# Patient Record
Sex: FEMALE | Race: WHITE | HISPANIC OR LATINO | Employment: FULL TIME | URBAN - METROPOLITAN AREA
[De-identification: names, ages, dates, MRNs, and addresses within clinical notes are randomized per-mention and may not be internally consistent; named-entity substitution may affect disease eponyms.]

---

## 2024-08-09 ENCOUNTER — HOSPITAL ENCOUNTER (OUTPATIENT)
Dept: LAB | Facility: MEDICAL CENTER | Age: 34
End: 2024-08-09
Attending: FAMILY MEDICINE
Payer: COMMERCIAL

## 2024-08-09 ENCOUNTER — OFFICE VISIT (OUTPATIENT)
Dept: MEDICAL GROUP | Facility: PHYSICIAN GROUP | Age: 34
End: 2024-08-09
Payer: COMMERCIAL

## 2024-08-09 VITALS
SYSTOLIC BLOOD PRESSURE: 126 MMHG | BODY MASS INDEX: 51.91 KG/M2 | HEIGHT: 63 IN | RESPIRATION RATE: 18 BRPM | HEART RATE: 71 BPM | WEIGHT: 293 LBS | OXYGEN SATURATION: 99 % | DIASTOLIC BLOOD PRESSURE: 76 MMHG | TEMPERATURE: 97.7 F

## 2024-08-09 DIAGNOSIS — R63.5 WEIGHT GAIN: ICD-10-CM

## 2024-08-09 DIAGNOSIS — E66.01 MORBID OBESITY WITH BMI OF 50.0-59.9, ADULT (HCC): ICD-10-CM

## 2024-08-09 DIAGNOSIS — Z00.00 WELLNESS EXAMINATION: ICD-10-CM

## 2024-08-09 DIAGNOSIS — E55.9 VITAMIN D DEFICIENCY: ICD-10-CM

## 2024-08-09 DIAGNOSIS — R73.03 PREDIABETES: ICD-10-CM

## 2024-08-09 DIAGNOSIS — D64.89 ANEMIA DUE TO OTHER CAUSE, NOT CLASSIFIED: ICD-10-CM

## 2024-08-09 DIAGNOSIS — R79.89 ELEVATED LIVER FUNCTION TESTS: ICD-10-CM

## 2024-08-09 LAB
ALBUMIN SERPL BCP-MCNC: 3.8 G/DL (ref 3.2–4.9)
ALBUMIN/GLOB SERPL: 1.1 G/DL
ALP SERPL-CCNC: 70 U/L (ref 30–99)
ALT SERPL-CCNC: 23 U/L (ref 2–50)
ANION GAP SERPL CALC-SCNC: 11 MMOL/L (ref 7–16)
ANISOCYTOSIS BLD QL SMEAR: ABNORMAL
AST SERPL-CCNC: 29 U/L (ref 12–45)
BASOPHILS # BLD AUTO: 0.7 % (ref 0–1.8)
BASOPHILS # BLD: 0.07 K/UL (ref 0–0.12)
BILIRUB SERPL-MCNC: 0.6 MG/DL (ref 0.1–1.5)
BUN SERPL-MCNC: 12 MG/DL (ref 8–22)
CALCIUM ALBUM COR SERPL-MCNC: 9.3 MG/DL (ref 8.5–10.5)
CALCIUM SERPL-MCNC: 9.1 MG/DL (ref 8.5–10.5)
CHLORIDE SERPL-SCNC: 104 MMOL/L (ref 96–112)
CHOLEST SERPL-MCNC: 199 MG/DL (ref 100–199)
CO2 SERPL-SCNC: 24 MMOL/L (ref 20–33)
COMMENT 1642: NORMAL
CREAT SERPL-MCNC: 0.67 MG/DL (ref 0.5–1.4)
EOSINOPHIL # BLD AUTO: 0.17 K/UL (ref 0–0.51)
EOSINOPHIL NFR BLD: 1.8 % (ref 0–6.9)
ERYTHROCYTE [DISTWIDTH] IN BLOOD BY AUTOMATED COUNT: 46.6 FL (ref 35.9–50)
EST. AVERAGE GLUCOSE BLD GHB EST-MCNC: 105 MG/DL
FASTING STATUS PATIENT QL REPORTED: NORMAL
GFR SERPLBLD CREATININE-BSD FMLA CKD-EPI: 118 ML/MIN/1.73 M 2
GLOBULIN SER CALC-MCNC: 3.5 G/DL (ref 1.9–3.5)
GLUCOSE SERPL-MCNC: 101 MG/DL (ref 65–99)
HBA1C MFR BLD: 5.3 % (ref 4–5.6)
HCT VFR BLD AUTO: 40.9 % (ref 37–47)
HDLC SERPL-MCNC: 57 MG/DL
HGB BLD-MCNC: 12.2 G/DL (ref 12–16)
IMM GRANULOCYTES # BLD AUTO: 0.02 K/UL (ref 0–0.11)
IMM GRANULOCYTES NFR BLD AUTO: 0.2 % (ref 0–0.9)
IRON SATN MFR SERPL: 15 % (ref 15–55)
IRON SERPL-MCNC: 60 UG/DL (ref 40–170)
LDLC SERPL CALC-MCNC: 119 MG/DL
LYMPHOCYTES # BLD AUTO: 3.31 K/UL (ref 1–4.8)
LYMPHOCYTES NFR BLD: 35.1 % (ref 22–41)
MCH RBC QN AUTO: 24.4 PG (ref 27–33)
MCHC RBC AUTO-ENTMCNC: 29.8 G/DL (ref 32.2–35.5)
MCV RBC AUTO: 81.6 FL (ref 81.4–97.8)
MICROCYTES BLD QL SMEAR: ABNORMAL
MONOCYTES # BLD AUTO: 0.71 K/UL (ref 0–0.85)
MONOCYTES NFR BLD AUTO: 7.5 % (ref 0–13.4)
MORPHOLOGY BLD-IMP: NORMAL
NEUTROPHILS # BLD AUTO: 5.15 K/UL (ref 1.82–7.42)
NEUTROPHILS NFR BLD: 54.7 % (ref 44–72)
NRBC # BLD AUTO: 0 K/UL
NRBC BLD-RTO: 0 /100 WBC (ref 0–0.2)
PLATELET # BLD AUTO: 309 K/UL (ref 164–446)
PLATELET BLD QL SMEAR: NORMAL
PMV BLD AUTO: 10 FL (ref 9–12.9)
POTASSIUM SERPL-SCNC: 4.5 MMOL/L (ref 3.6–5.5)
PROT SERPL-MCNC: 7.3 G/DL (ref 6–8.2)
RBC # BLD AUTO: 5.01 M/UL (ref 4.2–5.4)
RBC BLD AUTO: PRESENT
SODIUM SERPL-SCNC: 139 MMOL/L (ref 135–145)
TIBC SERPL-MCNC: 411 UG/DL (ref 250–450)
TRIGL SERPL-MCNC: 117 MG/DL (ref 0–149)
UIBC SERPL-MCNC: 351 UG/DL (ref 110–370)
WBC # BLD AUTO: 9.4 K/UL (ref 4.8–10.8)

## 2024-08-09 PROCEDURE — 85025 COMPLETE CBC W/AUTO DIFF WBC: CPT

## 2024-08-09 PROCEDURE — 83550 IRON BINDING TEST: CPT

## 2024-08-09 PROCEDURE — 83036 HEMOGLOBIN GLYCOSYLATED A1C: CPT

## 2024-08-09 PROCEDURE — 3074F SYST BP LT 130 MM HG: CPT | Performed by: FAMILY MEDICINE

## 2024-08-09 PROCEDURE — 80053 COMPREHEN METABOLIC PANEL: CPT

## 2024-08-09 PROCEDURE — 99214 OFFICE O/P EST MOD 30 MIN: CPT | Performed by: FAMILY MEDICINE

## 2024-08-09 PROCEDURE — 84443 ASSAY THYROID STIM HORMONE: CPT

## 2024-08-09 PROCEDURE — 3078F DIAST BP <80 MM HG: CPT | Performed by: FAMILY MEDICINE

## 2024-08-09 PROCEDURE — 83540 ASSAY OF IRON: CPT

## 2024-08-09 PROCEDURE — 36415 COLL VENOUS BLD VENIPUNCTURE: CPT

## 2024-08-09 PROCEDURE — 80061 LIPID PANEL: CPT

## 2024-08-09 PROCEDURE — 82306 VITAMIN D 25 HYDROXY: CPT

## 2024-08-09 PROCEDURE — 82728 ASSAY OF FERRITIN: CPT

## 2024-08-09 ASSESSMENT — PATIENT HEALTH QUESTIONNAIRE - PHQ9: CLINICAL INTERPRETATION OF PHQ2 SCORE: 0

## 2024-08-09 NOTE — PROGRESS NOTES
"Subjective:     CC:   Chief Complaint   Patient presents with    Follow-Up       HPI:   Radha presents today to discuss trying to lose weight.  Last time I did see patient was in August 2022.  I did inform patient I will need to see her back as we need to catch up and do blood work patient expressed understanding of this.  When I last seen her patient's ultrasound showed a fatty liver and some slight enlargement to her spleen.  Patient also showed some anemia patient states her menstrual cycles are not monthly occasionally she still bleeds heavily.    No past medical history on file.    Social History     Tobacco Use    Smoking status: Never    Smokeless tobacco: Never   Vaping Use    Vaping status: Never Used   Substance Use Topics    Alcohol use: Yes     Comment: Occ    Drug use: Never       Current Outpatient Medications Ordered in Epic   Medication Sig Dispense Refill    ferrous sulfate 325 (65 Fe) MG tablet Take 1 Tablet by mouth 2 times a day. 60 Tablet 1     No current Epic-ordered facility-administered medications on file.       Allergies:  Mushroom extract complex and Shellfish-derived products    Health Maintenance: Completed    ROS:  Gen: no fevers/chills, patient has lost 30 pounds since have last seen her 2 years ago  Eyes: no changes in vision  ENT: no sore throat, no hearing loss, no bloody nose  Pulm: no sob, no cough  CV: no chest pain, no palpitations  GI: no nausea/vomiting, no diarrhea  : no dysuria  MSk: no myalgias  Skin: no rash  Neuro: no headaches, no numbness/tingling  Heme/Lymph: no easy bruising    Objective:     Exam:  /76 (BP Location: Right arm, Patient Position: Sitting, BP Cuff Size: Large adult)   Pulse 71   Temp 36.5 °C (97.7 °F) (Temporal)   Resp 18   Ht 1.6 m (5' 3\")   Wt (!) 135 kg (298 lb 3.2 oz)   LMP 07/19/2024 (Approximate)   SpO2 99%   BMI 52.82 kg/m²  Body mass index is 52.82 kg/m².    Gen: Alert and oriented, No apparent distress.  Skin: Warm and dry.  No " obvious lesions.  Eyes: Sclera wnl Pupils normal in size  Lungs: Normal effort, CTA bilaterally, no wheezes, rhonchi, or rales  CV: Regular rate and rhythm. No murmurs, rubs, or gallops.  Musculoskeletal: Normal gait. No extremity cyanosis, clubbing, or edema.  Neuro: Oriented to person, place and time  Psych: Mood is wnl     Labs: Patient given a listing of all the [facility] labs inform patient she should get her lab work done fasting    Assessment & Plan:     34 y.o. female with the following -     1. Anemia due to other cause, not classified  Recommend checking her CBC and iron due to fact it was abnormal 2 years ago  - CBC WITH DIFFERENTIAL; Future  - FERRITIN; Future  - IRON/TOTAL IRON BIND; Future    2. Prediabetes  Recommend rechecking this again  - Comp Metabolic Panel; Future  - HEMOGLOBIN A1C; Future    3. Elevated liver function tests  Recommend checking her liver test  - Comp Metabolic Panel; Future    4. Weight gain  Patient is agreeable to see nutritionist also will check her thyroid  - TSH; Future  - Referral to Nutrition Services    5. Vitamin D deficiency  Commend checking her vitamin D  - VITAMIN D,25 HYDROXY (DEFICIENCY); Future    6. Wellness examination  - Lipid Profile; Future    7. Morbid obesity with BMI of 50.0-59.9, adult (HCC)  Patient is uncertain on what she wants to do to lose weight her goal is to go down below 200.  I did inform patient this will take time she may want to check with her insurance to see if she qualifies for gastric bypass or lap band I will see her back in a couple weeks.  - Referral to Nutrition Services       Return in about 3 weeks (around 8/30/2024), or if symptoms worsen or fail to improve.    Please note that this dictation was created using voice recognition software. I have made every reasonable attempt to correct obvious errors, but I expect that there are errors of grammar and possibly content that I did not discover before finalizing the note.

## 2024-08-10 LAB
25(OH)D3 SERPL-MCNC: 14 NG/ML (ref 30–100)
FERRITIN SERPL-MCNC: 9.4 NG/ML (ref 10–291)
TSH SERPL-ACNC: 2.27 UIU/ML (ref 0.35–5.5)

## 2024-08-20 ENCOUNTER — OFFICE VISIT (OUTPATIENT)
Dept: MEDICAL GROUP | Facility: PHYSICIAN GROUP | Age: 34
End: 2024-08-20
Payer: COMMERCIAL

## 2024-08-20 VITALS
OXYGEN SATURATION: 97 % | HEIGHT: 63 IN | HEART RATE: 75 BPM | BODY MASS INDEX: 51.91 KG/M2 | DIASTOLIC BLOOD PRESSURE: 70 MMHG | TEMPERATURE: 97.5 F | RESPIRATION RATE: 18 BRPM | WEIGHT: 293 LBS | SYSTOLIC BLOOD PRESSURE: 110 MMHG

## 2024-08-20 DIAGNOSIS — E66.01 MORBID OBESITY WITH BMI OF 50.0-59.9, ADULT (HCC): ICD-10-CM

## 2024-08-20 DIAGNOSIS — R73.03 PREDIABETES: ICD-10-CM

## 2024-08-20 DIAGNOSIS — R79.89 ELEVATED LIVER FUNCTION TESTS: ICD-10-CM

## 2024-08-20 DIAGNOSIS — D64.89 ANEMIA DUE TO OTHER CAUSE, NOT CLASSIFIED: ICD-10-CM

## 2024-08-20 PROCEDURE — 99214 OFFICE O/P EST MOD 30 MIN: CPT | Performed by: FAMILY MEDICINE

## 2024-08-20 PROCEDURE — 3074F SYST BP LT 130 MM HG: CPT | Performed by: FAMILY MEDICINE

## 2024-08-20 PROCEDURE — 3078F DIAST BP <80 MM HG: CPT | Performed by: FAMILY MEDICINE

## 2024-08-20 ASSESSMENT — FIBROSIS 4 INDEX: FIB4 SCORE: 0.65

## 2024-08-29 ENCOUNTER — NON-PROVIDER VISIT (OUTPATIENT)
Dept: INTERNAL MEDICINE | Facility: OTHER | Age: 34
End: 2024-08-29
Payer: COMMERCIAL

## 2024-08-29 DIAGNOSIS — R63.5 WEIGHT GAIN: ICD-10-CM

## 2024-08-29 DIAGNOSIS — E66.01 MORBID OBESITY (HCC): ICD-10-CM

## 2024-08-29 PROCEDURE — 97802 MEDICAL NUTRITION INDIV IN: CPT | Performed by: DIETITIAN, REGISTERED

## 2024-08-29 NOTE — PROGRESS NOTES
Radha is a 34 y.o. female here for nutrition counseling/dietitian assessment for weight gain and obesity per referral from 8/9/2024 with diagnosis code of R63.5, E66.01,Z68.43    Noted labs from August 2024 show elevated fasting glucose and A1c at 5.7% (5.7% in 2022 also)  Elevated LDL at 119 and low Vitamin D at 14    Looking into weight loss surgery but not sure which one yet  Has not met with the surgeons just yet  Has lost weight on her weight - recently she has lost 32# on her own and actively trying to lose weight. This has taken her about 1.5 years  Feels it is taking a lot of time and wanting to do something that helps her long term     Highest adult body weight 398#  Lowest adult body weight current weight   Currently at 296#   In the last month she has lost 2-3 lbs and sees a lot of fluctuations     Her  has also recently lost weight and has been more active and she wants to be able to do that as well and really looking for ability/activity     Positive family history of diabetes     Shellfish and mushroom allergy     Changes she has made:  What she eats and drinks  Used to drink a lot of sweet coffee drinks and soda   Can be picky - so hard to find foods she likes     24 hour recall:  B - 10am - water, yogurt with granola, peach  S -11:30am - pear  L - skipped due to busy day (usually has a packed lunch - nuts/dried fruit or salami and cheese and crackers)   D - roasted potatoes, green beans, grilled chicken with garlic parmesan sauce (homemade), water     Beverages:  oz Water, black coffee or coffee with little creamer, occ will have a soda when she goes out to eat (regular soda), no juice, no milk (lactose intolerant), energy drink every couple of weeks   Alcohol: 1x/month usually sweet      Eating out: 1x/week if that - Korean BBQ, [employer]     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

## 2024-08-29 NOTE — PATIENT INSTRUCTIONS
Goals:  Sign up with the gym around the corner. Start out with getting to the gym 3 days per week for at least 30 minutes   Work on making sure to include protein at every meal. Consider trying the Premier protein shakes more often and other good protein choices: hard boiled eggs, turkey breast/chicken breast lunch meat, lentils, greek yogurt, jerky, edamame, peanut powder.  Continue to stick to non calorie beverages  Keep including veggies/fruits in your meals  Check out [employer]one to start tracking your food to learn protein content of foods   [facility]

## 2024-09-05 VITALS — WEIGHT: 293 LBS | BODY MASS INDEX: 51.91 KG/M2 | HEIGHT: 63 IN

## 2024-09-05 ASSESSMENT — FIBROSIS 4 INDEX: FIB4 SCORE: 0.65

## 2024-10-28 ENCOUNTER — OFFICE VISIT (OUTPATIENT)
Dept: MEDICAL GROUP | Facility: PHYSICIAN GROUP | Age: 34
End: 2024-10-28
Payer: COMMERCIAL

## 2024-10-28 VITALS
DIASTOLIC BLOOD PRESSURE: 72 MMHG | HEART RATE: 82 BPM | RESPIRATION RATE: 18 BRPM | HEIGHT: 63 IN | SYSTOLIC BLOOD PRESSURE: 112 MMHG | OXYGEN SATURATION: 96 % | WEIGHT: 293 LBS | TEMPERATURE: 97.8 F | BODY MASS INDEX: 51.91 KG/M2

## 2024-10-28 DIAGNOSIS — N92.6 ABNORMAL MENSES: ICD-10-CM

## 2024-10-28 DIAGNOSIS — T78.40XA ALLERGIC REACTION, INITIAL ENCOUNTER: ICD-10-CM

## 2024-10-28 DIAGNOSIS — D64.89 ANEMIA DUE TO OTHER CAUSE, NOT CLASSIFIED: ICD-10-CM

## 2024-10-28 DIAGNOSIS — E66.01 MORBID OBESITY WITH BMI OF 50.0-59.9, ADULT (HCC): ICD-10-CM

## 2024-10-28 PROCEDURE — 3078F DIAST BP <80 MM HG: CPT | Performed by: FAMILY MEDICINE

## 2024-10-28 PROCEDURE — 3074F SYST BP LT 130 MM HG: CPT | Performed by: FAMILY MEDICINE

## 2024-10-28 PROCEDURE — 99214 OFFICE O/P EST MOD 30 MIN: CPT | Performed by: FAMILY MEDICINE

## 2024-10-28 RX ORDER — EPINEPHRINE 0.3 MG/.3ML
INJECTION SUBCUTANEOUS
Qty: 1 EACH | Refills: 1 | Status: SHIPPED | OUTPATIENT
Start: 2024-10-28

## 2024-10-28 ASSESSMENT — FIBROSIS 4 INDEX: FIB4 SCORE: 0.65

## 2024-11-11 ENCOUNTER — APPOINTMENT (OUTPATIENT)
Dept: INTERNAL MEDICINE | Facility: OTHER | Age: 34
End: 2024-11-11
Payer: COMMERCIAL

## 2024-11-26 ENCOUNTER — HOSPITAL ENCOUNTER (OUTPATIENT)
Dept: RADIOLOGY | Facility: MEDICAL CENTER | Age: 34
End: 2024-11-26
Attending: CLINICAL NURSE SPECIALIST
Payer: COMMERCIAL

## 2024-11-26 DIAGNOSIS — K21.9 CHALASIA OF LOWER ESOPHAGEAL SPHINCTER: ICD-10-CM

## 2024-11-26 PROCEDURE — 74240 X-RAY XM UPR GI TRC 1CNTRST: CPT

## 2024-12-07 ENCOUNTER — HOSPITAL ENCOUNTER (OUTPATIENT)
Dept: LAB | Facility: MEDICAL CENTER | Age: 34
End: 2024-12-07
Attending: CLINICAL NURSE SPECIALIST
Payer: COMMERCIAL

## 2024-12-07 ENCOUNTER — HOSPITAL ENCOUNTER (OUTPATIENT)
Dept: LAB | Facility: MEDICAL CENTER | Age: 34
End: 2024-12-07
Attending: FAMILY MEDICINE
Payer: COMMERCIAL

## 2024-12-07 DIAGNOSIS — D64.89 ANEMIA DUE TO OTHER CAUSE, NOT CLASSIFIED: ICD-10-CM

## 2024-12-07 DIAGNOSIS — R79.89 ELEVATED LIVER FUNCTION TESTS: ICD-10-CM

## 2024-12-07 LAB
25(OH)D3 SERPL-MCNC: 12 NG/ML (ref 30–100)
ALBUMIN SERPL BCP-MCNC: 4 G/DL (ref 3.2–4.9)
ALBUMIN SERPL BCP-MCNC: 4.1 G/DL (ref 3.2–4.9)
ALBUMIN/GLOB SERPL: 1.1 G/DL
ALBUMIN/GLOB SERPL: 1.1 G/DL
ALP SERPL-CCNC: 62 U/L (ref 30–99)
ALP SERPL-CCNC: 63 U/L (ref 30–99)
ALT SERPL-CCNC: 11 U/L (ref 2–50)
ALT SERPL-CCNC: 12 U/L (ref 2–50)
ANION GAP SERPL CALC-SCNC: 8 MMOL/L (ref 7–16)
ANION GAP SERPL CALC-SCNC: 9 MMOL/L (ref 7–16)
ANISOCYTOSIS BLD QL SMEAR: ABNORMAL
AST SERPL-CCNC: 25 U/L (ref 12–45)
AST SERPL-CCNC: 26 U/L (ref 12–45)
BASOPHILS # BLD AUTO: 0.7 % (ref 0–1.8)
BASOPHILS # BLD AUTO: 0.9 % (ref 0–1.8)
BASOPHILS # BLD: 0.06 K/UL (ref 0–0.12)
BASOPHILS # BLD: 0.07 K/UL (ref 0–0.12)
BILIRUB SERPL-MCNC: 0.6 MG/DL (ref 0.1–1.5)
BILIRUB SERPL-MCNC: 0.6 MG/DL (ref 0.1–1.5)
BUN SERPL-MCNC: 12 MG/DL (ref 8–22)
BUN SERPL-MCNC: 13 MG/DL (ref 8–22)
CALCIUM ALBUM COR SERPL-MCNC: 9 MG/DL (ref 8.5–10.5)
CALCIUM ALBUM COR SERPL-MCNC: 9.2 MG/DL (ref 8.5–10.5)
CALCIUM SERPL-MCNC: 9.1 MG/DL (ref 8.5–10.5)
CALCIUM SERPL-MCNC: 9.2 MG/DL (ref 8.5–10.5)
CHLORIDE SERPL-SCNC: 105 MMOL/L (ref 96–112)
CHLORIDE SERPL-SCNC: 105 MMOL/L (ref 96–112)
CHOLEST SERPL-MCNC: 175 MG/DL (ref 100–199)
CO2 SERPL-SCNC: 24 MMOL/L (ref 20–33)
CO2 SERPL-SCNC: 24 MMOL/L (ref 20–33)
COMMENT 1642: NORMAL
CREAT SERPL-MCNC: 0.67 MG/DL (ref 0.5–1.4)
CREAT SERPL-MCNC: 0.72 MG/DL (ref 0.5–1.4)
EOSINOPHIL # BLD AUTO: 0.13 K/UL (ref 0–0.51)
EOSINOPHIL # BLD AUTO: 0.14 K/UL (ref 0–0.51)
EOSINOPHIL NFR BLD: 1.7 % (ref 0–6.9)
EOSINOPHIL NFR BLD: 1.8 % (ref 0–6.9)
ERYTHROCYTE [DISTWIDTH] IN BLOOD BY AUTOMATED COUNT: 45.6 FL (ref 35.9–50)
ERYTHROCYTE [DISTWIDTH] IN BLOOD BY AUTOMATED COUNT: 46.5 FL (ref 35.9–50)
FERRITIN SERPL-MCNC: 11.4 NG/ML (ref 10–291)
FERRITIN SERPL-MCNC: 11.7 NG/ML (ref 10–291)
FOLATE SERPL-MCNC: 12 NG/ML
GFR SERPLBLD CREATININE-BSD FMLA CKD-EPI: 113 ML/MIN/1.73 M 2
GFR SERPLBLD CREATININE-BSD FMLA CKD-EPI: 118 ML/MIN/1.73 M 2
GLOBULIN SER CALC-MCNC: 3.7 G/DL (ref 1.9–3.5)
GLOBULIN SER CALC-MCNC: 3.7 G/DL (ref 1.9–3.5)
GLUCOSE SERPL-MCNC: 102 MG/DL (ref 65–99)
GLUCOSE SERPL-MCNC: 103 MG/DL (ref 65–99)
HCT VFR BLD AUTO: 39.6 % (ref 37–47)
HCT VFR BLD AUTO: 40.6 % (ref 37–47)
HDLC SERPL-MCNC: 46 MG/DL
HGB BLD-MCNC: 11.8 G/DL (ref 12–16)
HGB BLD-MCNC: 11.9 G/DL (ref 12–16)
IMM GRANULOCYTES # BLD AUTO: 0.02 K/UL (ref 0–0.11)
IMM GRANULOCYTES # BLD AUTO: 0.04 K/UL (ref 0–0.11)
IMM GRANULOCYTES NFR BLD AUTO: 0.2 % (ref 0–0.9)
IMM GRANULOCYTES NFR BLD AUTO: 0.5 % (ref 0–0.9)
IRON SATN MFR SERPL: 8 % (ref 15–55)
IRON SATN MFR SERPL: 8 % (ref 15–55)
IRON SERPL-MCNC: 35 UG/DL (ref 40–170)
IRON SERPL-MCNC: 35 UG/DL (ref 40–170)
LDLC SERPL CALC-MCNC: 113 MG/DL
LYMPHOCYTES # BLD AUTO: 2.42 K/UL (ref 1–4.8)
LYMPHOCYTES # BLD AUTO: 2.52 K/UL (ref 1–4.8)
LYMPHOCYTES NFR BLD: 31.4 % (ref 22–41)
LYMPHOCYTES NFR BLD: 32.6 % (ref 22–41)
MCH RBC QN AUTO: 23.2 PG (ref 27–33)
MCH RBC QN AUTO: 23.5 PG (ref 27–33)
MCHC RBC AUTO-ENTMCNC: 29.1 G/DL (ref 32.2–35.5)
MCHC RBC AUTO-ENTMCNC: 30.1 G/DL (ref 32.2–35.5)
MCV RBC AUTO: 78.3 FL (ref 81.4–97.8)
MCV RBC AUTO: 79.8 FL (ref 81.4–97.8)
MICROCYTES BLD QL SMEAR: ABNORMAL
MONOCYTES # BLD AUTO: 0.64 K/UL (ref 0–0.85)
MONOCYTES # BLD AUTO: 0.73 K/UL (ref 0–0.85)
MONOCYTES NFR BLD AUTO: 8.6 % (ref 0–13.4)
MONOCYTES NFR BLD AUTO: 9.1 % (ref 0–13.4)
MORPHOLOGY BLD-IMP: NORMAL
NEUTROPHILS # BLD AUTO: 4.12 K/UL (ref 1.82–7.42)
NEUTROPHILS # BLD AUTO: 4.56 K/UL (ref 1.82–7.42)
NEUTROPHILS NFR BLD: 55.6 % (ref 44–72)
NEUTROPHILS NFR BLD: 56.9 % (ref 44–72)
NRBC # BLD AUTO: 0 K/UL
NRBC # BLD AUTO: 0 K/UL
NRBC BLD-RTO: 0 /100 WBC (ref 0–0.2)
NRBC BLD-RTO: 0 /100 WBC (ref 0–0.2)
PLATELET # BLD AUTO: 314 K/UL (ref 164–446)
PLATELET # BLD AUTO: 314 K/UL (ref 164–446)
PLATELET BLD QL SMEAR: NORMAL
PMV BLD AUTO: 9.8 FL (ref 9–12.9)
PMV BLD AUTO: 9.9 FL (ref 9–12.9)
POTASSIUM SERPL-SCNC: 4.3 MMOL/L (ref 3.6–5.5)
POTASSIUM SERPL-SCNC: 4.3 MMOL/L (ref 3.6–5.5)
PROT SERPL-MCNC: 7.7 G/DL (ref 6–8.2)
PROT SERPL-MCNC: 7.8 G/DL (ref 6–8.2)
RBC # BLD AUTO: 5.06 M/UL (ref 4.2–5.4)
RBC # BLD AUTO: 5.09 M/UL (ref 4.2–5.4)
RBC BLD AUTO: PRESENT
SODIUM SERPL-SCNC: 137 MMOL/L (ref 135–145)
SODIUM SERPL-SCNC: 138 MMOL/L (ref 135–145)
TIBC SERPL-MCNC: 440 UG/DL (ref 250–450)
TIBC SERPL-MCNC: 441 UG/DL (ref 250–450)
TRIGL SERPL-MCNC: 81 MG/DL (ref 0–149)
TSH SERPL-ACNC: 2.01 UIU/ML (ref 0.35–5.5)
UIBC SERPL-MCNC: 405 UG/DL (ref 110–370)
UIBC SERPL-MCNC: 406 UG/DL (ref 110–370)
UREA BREATH TEST QL: POSITIVE
VIT B12 SERPL-MCNC: 426 PG/ML (ref 211–911)
WBC # BLD AUTO: 7.4 K/UL (ref 4.8–10.8)
WBC # BLD AUTO: 8 K/UL (ref 4.8–10.8)

## 2024-12-07 PROCEDURE — 80053 COMPREHEN METABOLIC PANEL: CPT | Mod: 91

## 2024-12-07 PROCEDURE — 83540 ASSAY OF IRON: CPT

## 2024-12-07 PROCEDURE — 36415 COLL VENOUS BLD VENIPUNCTURE: CPT

## 2024-12-07 PROCEDURE — 84443 ASSAY THYROID STIM HORMONE: CPT

## 2024-12-07 PROCEDURE — 82306 VITAMIN D 25 HYDROXY: CPT

## 2024-12-07 PROCEDURE — 80053 COMPREHEN METABOLIC PANEL: CPT

## 2024-12-07 PROCEDURE — 83550 IRON BINDING TEST: CPT | Mod: 91

## 2024-12-07 PROCEDURE — 85025 COMPLETE CBC W/AUTO DIFF WBC: CPT | Mod: 91

## 2024-12-07 PROCEDURE — 83013 H PYLORI (C-13) BREATH: CPT

## 2024-12-07 PROCEDURE — 83036 HEMOGLOBIN GLYCOSYLATED A1C: CPT

## 2024-12-07 PROCEDURE — 82746 ASSAY OF FOLIC ACID SERUM: CPT

## 2024-12-07 PROCEDURE — 84425 ASSAY OF VITAMIN B-1: CPT

## 2024-12-07 PROCEDURE — 82728 ASSAY OF FERRITIN: CPT | Mod: 91

## 2024-12-07 PROCEDURE — 82607 VITAMIN B-12: CPT

## 2024-12-07 PROCEDURE — 85025 COMPLETE CBC W/AUTO DIFF WBC: CPT

## 2024-12-07 PROCEDURE — 83550 IRON BINDING TEST: CPT

## 2024-12-07 PROCEDURE — 80061 LIPID PANEL: CPT

## 2024-12-07 PROCEDURE — 83540 ASSAY OF IRON: CPT | Mod: 91

## 2024-12-07 PROCEDURE — 82728 ASSAY OF FERRITIN: CPT

## 2024-12-08 LAB
EST. AVERAGE GLUCOSE BLD GHB EST-MCNC: 126 MG/DL
HBA1C MFR BLD: 6 % (ref 4–5.6)

## 2024-12-10 LAB — VIT B1 BLD-MCNC: 114 NMOL/L (ref 70–180)

## 2025-01-28 ENCOUNTER — HOSPITAL ENCOUNTER (OUTPATIENT)
Dept: LAB | Facility: MEDICAL CENTER | Age: 35
End: 2025-01-28
Attending: CLINICAL NURSE SPECIALIST
Payer: COMMERCIAL

## 2025-01-28 LAB — UREA BREATH TEST QL: NEGATIVE

## 2025-01-28 PROCEDURE — 83013 H PYLORI (C-13) BREATH: CPT

## 2025-02-19 ENCOUNTER — HOSPITAL ENCOUNTER (OUTPATIENT)
Dept: HEMATOLOGY ONCOLOGY | Facility: MEDICAL CENTER | Age: 35
End: 2025-02-19
Attending: INTERNAL MEDICINE
Payer: COMMERCIAL

## 2025-02-19 VITALS
BODY MASS INDEX: 51.91 KG/M2 | HEART RATE: 80 BPM | TEMPERATURE: 97.3 F | HEIGHT: 63 IN | WEIGHT: 293 LBS | RESPIRATION RATE: 16 BRPM | OXYGEN SATURATION: 99 % | SYSTOLIC BLOOD PRESSURE: 123 MMHG | DIASTOLIC BLOOD PRESSURE: 80 MMHG

## 2025-02-19 DIAGNOSIS — D64.89 ANEMIA DUE TO OTHER CAUSE, NOT CLASSIFIED: ICD-10-CM

## 2025-02-19 DIAGNOSIS — D64.9 ANEMIA, UNSPECIFIED TYPE: ICD-10-CM

## 2025-02-19 DIAGNOSIS — J45.20 MILD INTERMITTENT ASTHMA WITHOUT COMPLICATION: ICD-10-CM

## 2025-02-19 PROCEDURE — 99204 OFFICE O/P NEW MOD 45 MIN: CPT | Performed by: INTERNAL MEDICINE

## 2025-02-19 PROCEDURE — 99212 OFFICE O/P EST SF 10 MIN: CPT | Performed by: INTERNAL MEDICINE

## 2025-02-19 RX ORDER — METHYLPREDNISOLONE SODIUM SUCCINATE 125 MG/2ML
125 INJECTION, POWDER, LYOPHILIZED, FOR SOLUTION INTRAMUSCULAR; INTRAVENOUS PRN
OUTPATIENT
Start: 2025-02-19

## 2025-02-19 RX ORDER — DIPHENHYDRAMINE HYDROCHLORIDE 50 MG/ML
50 INJECTION INTRAMUSCULAR; INTRAVENOUS PRN
OUTPATIENT
Start: 2025-02-19

## 2025-02-19 RX ORDER — EPINEPHRINE 1 MG/ML(1)
0.5 AMPUL (ML) INJECTION PRN
OUTPATIENT
Start: 2025-02-19

## 2025-02-19 RX ORDER — SODIUM CHLORIDE 9 MG/ML
INJECTION, SOLUTION INTRAVENOUS CONTINUOUS
OUTPATIENT
Start: 2025-02-19

## 2025-02-19 ASSESSMENT — ENCOUNTER SYMPTOMS
SPUTUM PRODUCTION: 0
TREMORS: 0
DIZZINESS: 0
TINGLING: 0
SORE THROAT: 0
NAUSEA: 0
PALPITATIONS: 0
SENSORY CHANGE: 0
DEPRESSION: 0
HEADACHES: 0
FEVER: 0
ORTHOPNEA: 0
SHORTNESS OF BREATH: 0
BRUISES/BLEEDS EASILY: 0
FOCAL WEAKNESS: 0
COUGH: 0
WHEEZING: 0
ABDOMINAL PAIN: 0
VOMITING: 0
WEIGHT LOSS: 0
CHILLS: 0
HEARTBURN: 0
BLURRED VISION: 0
MEMORY LOSS: 0
NECK PAIN: 0

## 2025-02-19 ASSESSMENT — PAIN SCALES - GENERAL: PAINLEVEL_OUTOF10: NO PAIN

## 2025-02-19 ASSESSMENT — FIBROSIS 4 INDEX: FIB4 SCORE: 0.82

## 2025-02-19 NOTE — ASSESSMENT & PLAN NOTE
Orders:    CBC WITH DIFFERENTIAL; Future    Comp Metabolic Panel; Future    IRON/TOTAL IRON BIND; Future    FERRITIN; Future    RETICULOCYTES COUNT; Future

## 2025-02-19 NOTE — PROGRESS NOTES
Consult:  Hematology/Oncology      Referring Physician:   Primary Care:  Cordelia Payan M.D.    Diagnosis:     Chief Complaint:  New Patient (Iron deficiency anemia)      History of Presenting Illness:  Radha Marques is a 34 y.o. female iron deficiency anemia      No past medical history on file.    No past surgical history on file.    Social History     Tobacco Use    Smoking status: Never    Smokeless tobacco: Never   Vaping Use    Vaping status: Never Used   Substance Use Topics    Alcohol use: Yes     Comment: Occ    Drug use: Never        Family History   Problem Relation Age of Onset    Hypertension Mother     Lupus Mother     Diabetes Father     Diabetes Sister     Lupus Sister     Diabetes Paternal Grandmother        Allergies as of 02/19/2025 - Reviewed 02/19/2025   Allergen Reaction Noted    Mushroom extract complex Cough, Hives, Itching, and Rash 08/09/2024    Shellfish-derived products Cough, Hives, Itching, Rash, and Swelling 08/09/2024         Current Outpatient Medications:     EPINEPHrine (EPIPEN) 0.3 MG/0.3ML Solution Auto-injector solution for injection, Inject 0.3 mL into the thigh one time for 1 dose.  For anaphylactic reaction patient should go to the emergency room if this occurs, Disp: 1 Each, Rfl: 1    ferrous sulfate 325 (65 Fe) MG tablet, Take 1 Tablet by mouth 2 times a day., Disp: 60 Tablet, Rfl: 1    Review of Systems:  Review of Systems   Constitutional:  Negative for chills, fever, malaise/fatigue and weight loss.   HENT:  Negative for congestion, ear pain, nosebleeds and sore throat.    Eyes:  Negative for blurred vision.   Respiratory:  Negative for cough, sputum production, shortness of breath and wheezing.    Cardiovascular:  Negative for chest pain, palpitations, orthopnea and leg swelling.   Gastrointestinal:  Negative for abdominal pain, heartburn, nausea and vomiting.   Genitourinary:  Negative for dysuria, frequency and urgency.   Musculoskeletal:  Negative for neck  "pain.   Neurological:  Negative for dizziness, tingling, tremors, sensory change, focal weakness and headaches.   Endo/Heme/Allergies:  Does not bruise/bleed easily.   Psychiatric/Behavioral:  Negative for depression, memory loss and suicidal ideas.    All other systems reviewed and are negative.         Physical Exam:  Vitals:    02/19/25 0906   BP: 123/80   BP Location: Right arm   Patient Position: Sitting   BP Cuff Size: Adult   Pulse: 80   Resp: 16   Temp: 36.3 °C (97.3 °F)   TempSrc: Temporal   SpO2: 99%   Weight: (!) 137 kg (303 lb)   Height: 1.6 m (5' 2.99\")               Physical Exam  Constitutional:       General: She is not in acute distress.  HENT:      Head: Normocephalic.   Eyes:      Conjunctiva/sclera: Conjunctivae normal.   Cardiovascular:      Rate and Rhythm: Normal rate and regular rhythm.      Heart sounds: Normal heart sounds.   Pulmonary:      Effort: Pulmonary effort is normal.      Breath sounds: Normal breath sounds.   Abdominal:      General: Bowel sounds are normal.      Palpations: Abdomen is soft.   Musculoskeletal:         General: Normal range of motion.   Lymphadenopathy:      Cervical: No cervical adenopathy.   Skin:     General: Skin is dry.   Neurological:      General: No focal deficit present.      Mental Status: She is oriented to person, place, and time.   Psychiatric:         Thought Content: Thought content normal.          Depression Screening    Little interest or pleasure in doing things?      Feeling down, depressed , or hopeless?     Trouble falling or staying asleep, or sleeping too much?      Feeling tired or having little energy?      Poor appetite or overeating?      Feeling bad about yourself - or that you are a failure or have let yourself or your family down?     Trouble concentrating on things, such as reading the newspaper or watching television?     Moving or speaking so slowly that other people could have noticed.  Or the opposite - being so fidgety or " restless that you have been moving around a lot more than usual?      Thoughts that you would be better off dead, or of hurting yourself?      Patient Health Questionnaire Score:       If depressive symptoms identified deferred to follow up visit unless specifically addressed in assesment and plan.    Labs:  Hospital Outpatient Visit on 01/28/2025   Component Date Value Ref Range Status    H Pylori Breath Test 01/28/2025 Negative  Negative Final    Comment: INTERPRETIVE INFORMATION: Helicobacter pylori, Breath Test  A negative result does not rule out the possibility of H. pylori  infection. If clinical signs are suggestive of H. pylori  infection, retest with a new specimen or an alternate method.  Known causes of false-negative results include :  1. Ingestion of antimicrobials, proton pump inhibitors, and  bismuth preparations during the preceding 2 weeks.  Known causes of false-positive results include:  1. Patients with achlorhydria.  2. Rinsing the testing solution in the mouth or not using the  straw provided in the kit, which can allow contact with  urease-positive bacteria.  3. The presence of other gastric spiral organisms such as  Helicobacter heilmannii.  Note: The post-dose sample must be collected between 15 and 20  minutes post-dose to prevent a false-negative result.         Imaging:   All listed images below have been independently reviewed by me. I agree with the findings as summarized below:    No results found.     Pathology:      Assessment & Plan:  Assessment & Plan  Mild intermittent asthma without complication         Anemia, unspecified type    Orders:    CBC WITH DIFFERENTIAL; Future    Comp Metabolic Panel; Future    IRON/TOTAL IRON BIND; Future    FERRITIN; Future    RETICULOCYTES COUNT; Future    Anemia due to other cause, not classified             Anemia.  IV iron.    Any questions and concerns raised by the patient were answered to the best of my ability. Thank you for allowing me to  participate in the care for this patient. Please feel free to contact me for any questions or concerns.     Milton Turner M.D.

## 2025-02-25 ENCOUNTER — HOSPITAL ENCOUNTER (OUTPATIENT)
Dept: LAB | Facility: MEDICAL CENTER | Age: 35
End: 2025-02-25
Attending: INTERNAL MEDICINE
Payer: COMMERCIAL

## 2025-02-25 DIAGNOSIS — D64.9 ANEMIA, UNSPECIFIED TYPE: ICD-10-CM

## 2025-02-25 LAB
ALBUMIN SERPL BCP-MCNC: 3.8 G/DL (ref 3.2–4.9)
ALBUMIN/GLOB SERPL: 1 G/DL
ALP SERPL-CCNC: 65 U/L (ref 30–99)
ALT SERPL-CCNC: 23 U/L (ref 2–50)
ANION GAP SERPL CALC-SCNC: 10 MMOL/L (ref 7–16)
ANISOCYTOSIS BLD QL SMEAR: ABNORMAL
AST SERPL-CCNC: 26 U/L (ref 12–45)
BASOPHILS # BLD AUTO: 0.6 % (ref 0–1.8)
BASOPHILS # BLD: 0.06 K/UL (ref 0–0.12)
BILIRUB SERPL-MCNC: 0.4 MG/DL (ref 0.1–1.5)
BUN SERPL-MCNC: 15 MG/DL (ref 8–22)
CALCIUM ALBUM COR SERPL-MCNC: 9.4 MG/DL (ref 8.5–10.5)
CALCIUM SERPL-MCNC: 9.2 MG/DL (ref 8.5–10.5)
CHLORIDE SERPL-SCNC: 106 MMOL/L (ref 96–112)
CO2 SERPL-SCNC: 23 MMOL/L (ref 20–33)
COMMENT 1642: NORMAL
CREAT SERPL-MCNC: 0.62 MG/DL (ref 0.5–1.4)
EOSINOPHIL # BLD AUTO: 0.19 K/UL (ref 0–0.51)
EOSINOPHIL NFR BLD: 2 % (ref 0–6.9)
ERYTHROCYTE [DISTWIDTH] IN BLOOD BY AUTOMATED COUNT: 52.3 FL (ref 35.9–50)
FERRITIN SERPL-MCNC: 22.9 NG/ML (ref 10–291)
GFR SERPLBLD CREATININE-BSD FMLA CKD-EPI: 120 ML/MIN/1.73 M 2
GLOBULIN SER CALC-MCNC: 3.7 G/DL (ref 1.9–3.5)
GLUCOSE SERPL-MCNC: 97 MG/DL (ref 65–99)
HCT VFR BLD AUTO: 38.3 % (ref 37–47)
HGB BLD-MCNC: 11 G/DL (ref 12–16)
HGB RETIC QN AUTO: 32.5 PG/CELL (ref 29–35)
HYPOCHROMIA BLD QL SMEAR: ABNORMAL
IMM GRANULOCYTES # BLD AUTO: 0.07 K/UL (ref 0–0.11)
IMM GRANULOCYTES NFR BLD AUTO: 0.7 % (ref 0–0.9)
IMM RETICS NFR: 25.3 % (ref 2.6–16.1)
IRON SATN MFR SERPL: 14 % (ref 15–55)
IRON SERPL-MCNC: 57 UG/DL (ref 40–170)
LYMPHOCYTES # BLD AUTO: 2.91 K/UL (ref 1–4.8)
LYMPHOCYTES NFR BLD: 30.1 % (ref 22–41)
MCH RBC QN AUTO: 23.1 PG (ref 27–33)
MCHC RBC AUTO-ENTMCNC: 28.7 G/DL (ref 32.2–35.5)
MCV RBC AUTO: 80.5 FL (ref 81.4–97.8)
MICROCYTES BLD QL SMEAR: ABNORMAL
MONOCYTES # BLD AUTO: 0.81 K/UL (ref 0–0.85)
MONOCYTES NFR BLD AUTO: 8.4 % (ref 0–13.4)
MORPHOLOGY BLD-IMP: NORMAL
NEUTROPHILS # BLD AUTO: 5.62 K/UL (ref 1.82–7.42)
NEUTROPHILS NFR BLD: 58.2 % (ref 44–72)
NRBC # BLD AUTO: 0 K/UL
NRBC BLD-RTO: 0 /100 WBC (ref 0–0.2)
PLATELET # BLD AUTO: 315 K/UL (ref 164–446)
PLATELET BLD QL SMEAR: NORMAL
PMV BLD AUTO: 10 FL (ref 9–12.9)
POTASSIUM SERPL-SCNC: 4.5 MMOL/L (ref 3.6–5.5)
PROT SERPL-MCNC: 7.5 G/DL (ref 6–8.2)
RBC # BLD AUTO: 4.76 M/UL (ref 4.2–5.4)
RBC BLD AUTO: PRESENT
RETICS # AUTO: 0.12 M/UL (ref 0.04–0.12)
RETICS/RBC NFR: 2.6 % (ref 0.8–2.6)
SODIUM SERPL-SCNC: 139 MMOL/L (ref 135–145)
TIBC SERPL-MCNC: 414 UG/DL (ref 250–450)
UIBC SERPL-MCNC: 357 UG/DL (ref 110–370)
WBC # BLD AUTO: 9.7 K/UL (ref 4.8–10.8)

## 2025-02-25 PROCEDURE — 85025 COMPLETE CBC W/AUTO DIFF WBC: CPT

## 2025-02-25 PROCEDURE — 85046 RETICYTE/HGB CONCENTRATE: CPT

## 2025-02-25 PROCEDURE — 36415 COLL VENOUS BLD VENIPUNCTURE: CPT

## 2025-02-25 PROCEDURE — 83550 IRON BINDING TEST: CPT

## 2025-02-25 PROCEDURE — 82728 ASSAY OF FERRITIN: CPT

## 2025-02-25 PROCEDURE — 80053 COMPREHEN METABOLIC PANEL: CPT

## 2025-02-25 PROCEDURE — 83540 ASSAY OF IRON: CPT

## 2025-03-03 ENCOUNTER — OFFICE VISIT (OUTPATIENT)
Dept: DERMATOLOGY | Facility: IMAGING CENTER | Age: 35
End: 2025-03-03
Payer: COMMERCIAL

## 2025-03-03 DIAGNOSIS — L30.9 HAND DERMATITIS: ICD-10-CM

## 2025-03-03 DIAGNOSIS — L91.8 ACROCHORDON: ICD-10-CM

## 2025-03-03 PROCEDURE — 99999 PR NO CHARGE: CPT | Performed by: NURSE PRACTITIONER

## 2025-03-03 PROCEDURE — 99203 OFFICE O/P NEW LOW 30 MIN: CPT | Performed by: NURSE PRACTITIONER

## 2025-03-03 RX ORDER — CLOBETASOL PROPIONATE 0.5 MG/G
OINTMENT TOPICAL
Qty: 45 G | Refills: 1 | Status: SHIPPED | OUTPATIENT
Start: 2025-03-03 | End: 2025-03-04

## 2025-03-03 NOTE — PROGRESS NOTES
DERMATOLOGY NOTE  NEW VISIT       Chief complaint: Establish Care and Skin Lesion    Patient states he has skin tags that she would like removed. Patient aware if cosmetic cost will be out of pocket.     Patient also states she has an eczema flair up and is inquiring about injection in her scalp to reduce sweating from scalp    HPI: fingers, hands    Onset: x 10+ years   Aggravating factors: gel nail polish, winter / cold weather, dry weather   Alleviating factors: moisturizer - gold bond lotion   Treatments: None recently      HPI/location: under axilla   Time present: x years   Painful lesion: Yes  Itching lesion: No  Enlarging lesion: Yes      History of skin cancer: No  History of precancers/actinic keratoses: No  History of biopsies:No  History of blistering/severe sunburns:Yes, Details: as a child  Family history of skin cancer:No  Family history of atypical moles:No      Allergies   Allergen Reactions    Mushroom Extract Complex Cough, Hives, Itching and Rash    Shellfish-Derived Products Cough, Hives, Itching, Rash and Swelling        MEDICATIONS:  Medications relevant to specialty reviewed.     REVIEW OF SYSTEMS:   Positive for skin (see HPI)  Negative for fevers and chills       EXAM:  There were no vitals taken for this visit.  Constitutional: Well-developed, well-nourished, and in no distress.     A focused skin exam was performed including the affected areas of the  axilla, hands. Notable findings on exam today listed below and/or in assessment/plan.     Faint erythema with fine scale and cracking to lateral and medial aspect of several fingers of bilateral hands  Numerous 1-2mm skin-colored to hyperpigmented, soft, pedunculated papules bilateral axilla    IMPRESSION / PLAN:    1. Hand dermatitis  Stressed importance of emollient use  Rx below  Follow up 5 weeks  - clobetasol (TEMOVATE) 0.05 % Ointment; AAA, hands and finger, 1-2 times per day for 2 weeks at a time with 1-2 week break in between   Dispense: 45 g; Refill: 1    2. Acrochordon  See separate cosmetic visit note      Patient verbalized understanding and agrees with plan regarding the above            Please note that this dictation was created using voice recognition software. I have made every reasonable attempt to correct obvious errors, but I expect that there are errors of grammar and possibly content that I did not discover before finalizing the note.      Return to clinic in: Return in about 5 weeks (around 4/7/2025). and as needed for any new or changing skin lesions.

## 2025-03-03 NOTE — PROGRESS NOTES
Procedure: cryotherapy/hyfrecation for acrochordon    Provider: INÉS Jerry FNP-C    Cryotherapy/hyfrecation:    Risks (including, but not limited to: infection, scarring, and bleeding, blistering, blood blister, hyper or hypopigmentation, failure of tx). Patient verbally agreed to proceed with treatment. 1 freeze/thaw cycles applied to approx. 20 lesions on bilateral axilla. Patient tolerated procedure well. Aftercare instructions given.    BILL MartinezC

## 2025-03-04 RX ORDER — CLOBETASOL PROPIONATE 0.5 MG/G
OINTMENT TOPICAL
Qty: 45 G | Refills: 1 | Status: SHIPPED | OUTPATIENT
Start: 2025-03-04

## 2025-03-04 NOTE — TELEPHONE ENCOUNTER
CLOBETASOL 0.05%    OIN     Pharmacy comment: Please clarify the directions  for this prescription. Ins requires # of grams per application.

## 2025-03-11 ENCOUNTER — RESULTS FOLLOW-UP (OUTPATIENT)
Dept: HEMATOLOGY ONCOLOGY | Facility: MEDICAL CENTER | Age: 35
End: 2025-03-11
Payer: COMMERCIAL

## 2025-03-13 ENCOUNTER — OUTPATIENT INFUSION SERVICES (OUTPATIENT)
Dept: ONCOLOGY | Facility: MEDICAL CENTER | Age: 35
End: 2025-03-13
Attending: INTERNAL MEDICINE
Payer: COMMERCIAL

## 2025-03-13 VITALS
RESPIRATION RATE: 18 BRPM | HEART RATE: 85 BPM | WEIGHT: 293 LBS | DIASTOLIC BLOOD PRESSURE: 83 MMHG | BODY MASS INDEX: 51.91 KG/M2 | OXYGEN SATURATION: 98 % | HEIGHT: 63 IN | SYSTOLIC BLOOD PRESSURE: 135 MMHG | TEMPERATURE: 96.7 F

## 2025-03-13 DIAGNOSIS — D64.9 ANEMIA, UNSPECIFIED TYPE: ICD-10-CM

## 2025-03-13 PROCEDURE — 700105 HCHG RX REV CODE 258: Performed by: INTERNAL MEDICINE

## 2025-03-13 PROCEDURE — 700111 HCHG RX REV CODE 636 W/ 250 OVERRIDE (IP): Mod: JZ | Performed by: INTERNAL MEDICINE

## 2025-03-13 PROCEDURE — 96365 THER/PROPH/DIAG IV INF INIT: CPT

## 2025-03-13 PROCEDURE — 96375 TX/PRO/DX INJ NEW DRUG ADDON: CPT

## 2025-03-13 RX ORDER — DIPHENHYDRAMINE HYDROCHLORIDE 50 MG/ML
50 INJECTION, SOLUTION INTRAMUSCULAR; INTRAVENOUS PRN
Status: DISCONTINUED | OUTPATIENT
Start: 2025-03-13 | End: 2025-03-13

## 2025-03-13 RX ORDER — METHYLPREDNISOLONE SODIUM SUCCINATE 125 MG/2ML
125 INJECTION, POWDER, LYOPHILIZED, FOR SOLUTION INTRAMUSCULAR; INTRAVENOUS PRN
Status: CANCELLED | OUTPATIENT
Start: 2025-03-13

## 2025-03-13 RX ORDER — METHYLPREDNISOLONE SODIUM SUCCINATE 125 MG/2ML
125 INJECTION, POWDER, LYOPHILIZED, FOR SOLUTION INTRAMUSCULAR; INTRAVENOUS PRN
Status: COMPLETED | OUTPATIENT
Start: 2025-03-13 | End: 2025-03-13

## 2025-03-13 RX ORDER — SODIUM CHLORIDE 9 MG/ML
INJECTION, SOLUTION INTRAVENOUS CONTINUOUS
Status: CANCELLED | OUTPATIENT
Start: 2025-03-13

## 2025-03-13 RX ORDER — EPINEPHRINE 1 MG/ML(1)
0.5 AMPUL (ML) INJECTION PRN
Status: DISCONTINUED | OUTPATIENT
Start: 2025-03-13 | End: 2025-03-13

## 2025-03-13 RX ORDER — DIPHENHYDRAMINE HYDROCHLORIDE 50 MG/ML
50 INJECTION, SOLUTION INTRAMUSCULAR; INTRAVENOUS PRN
Status: CANCELLED | OUTPATIENT
Start: 2025-03-13

## 2025-03-13 RX ORDER — METHYLPREDNISOLONE SODIUM SUCCINATE 125 MG/2ML
125 INJECTION, POWDER, LYOPHILIZED, FOR SOLUTION INTRAMUSCULAR; INTRAVENOUS PRN
Status: DISCONTINUED | OUTPATIENT
Start: 2025-03-13 | End: 2025-03-13

## 2025-03-13 RX ORDER — EPINEPHRINE 1 MG/ML(1)
0.5 AMPUL (ML) INJECTION PRN
Status: CANCELLED | OUTPATIENT
Start: 2025-03-13

## 2025-03-13 RX ADMIN — METHYLPREDNISOLONE SODIUM SUCCINATE 125 MG: 125 INJECTION, POWDER, FOR SOLUTION INTRAMUSCULAR; INTRAVENOUS at 09:16

## 2025-03-13 RX ADMIN — SODIUM CHLORIDE 1000 MG: 9 INJECTION, SOLUTION INTRAVENOUS at 09:24

## 2025-03-13 ASSESSMENT — FIBROSIS 4 INDEX: FIB4 SCORE: 0.57

## 2025-03-13 NOTE — PROGRESS NOTES
Patient arrived to infusion for first dose of Iron dextran. PIV placed to L arm with brisk blood return. Education regarding infusion and all questions answered by RN. Patient was pretreated with IV solumedrol.  Iron dextran infused per order. Patient tolerated infusion without complaint. PIV  flushed per policy and removed with tip intact. Patient left in stable condition.

## 2025-04-01 ENCOUNTER — TELEPHONE (OUTPATIENT)
Dept: HEMATOLOGY ONCOLOGY | Facility: MEDICAL CENTER | Age: 35
End: 2025-04-01
Payer: COMMERCIAL

## 2025-04-01 NOTE — PROGRESS NOTES
"Follow Up Note:  Hematology/Oncology    Current Diagnosis : Iron deficiency anemia  Date of Diagnosis: ~2021    Chief Complaint: Patient seen today for evaluation and ongoing management of anemia.     Care Team:   Cordelia Payan M.D.  Dr Turner (previous)    Oncology History of Presenting Illness:   Radha is a pleasant 35-year-old female who was initially referred to hematology clinic in Jan by [facility] [place] [facility] for \" ongoing deficiency despite taking oral iron, would like iron infusion prior to surgery (gastric sleeve)\".  She established with Dr. Turner in Feb 2025 & completed her first course of IV Iron on 3/13/25.   In ~2021 she was experiencing progressive weakness, fatigue, shortness of breath on exertion and her PCP discovered anemia on her lab work and referred her to the ED where she received a blood transfusion.  She felt quite well subsequently for couple months to a year.  She has been on various oral OTC supplements since then with intermittent episodes of fatigue, shortness of breath.  She stopped her oral supplements just prior to the March infusion  She has fairly regular menstrual cycles.  Back in 2021 approximately every other cycle was quite heavy, over the past year to she has only had 1 or 2 heavy cycles.  She is not on any hormonal birth control or IUDs.     Current Treatment: IV iron prn    Treatment History:   03/13/25: IV Iron dextran 1000mg    Interval History Tanmay COSBY:  Jaqueline returns to clinic today for follow-up.  She received her first iron infusion last month and did noticed improved energy and stamina, less shortness of breath with exertion since then which continues to last.  She is scheduled for another follow-up with [facility] [place] later this week.  No other abnormal bleeding.  Dark stools only when taking oral iron.    Allergies as of 04/02/2025 - Reviewed 03/13/2025   Allergen Reaction Noted    Mushroom extract complex Cough, Hives, Itching, and Rash 08/09/2024    " "Shellfish-derived products Cough, Hives, Itching, Rash, and Swelling 08/09/2024       Current Outpatient Medications:     clobetasol (TEMOVATE) 0.05 % Ointment, APPLY APPLICATION, 0.5 grams 1-2 TIMES DAILY FOR 2 WEEKS AT A TIME WITH 1-2 WEEK BREAK IN BETWEEN ON AFFECTED AREAS HANDS AND FINGER, Disp: 45 g, Rfl: 1    EPINEPHrine (EPIPEN) 0.3 MG/0.3ML Solution Auto-injector solution for injection, Inject 0.3 mL into the thigh one time for 1 dose.  For anaphylactic reaction patient should go to the emergency room if this occurs, Disp: 1 Each, Rfl: 1    ferrous sulfate 325 (65 Fe) MG tablet, Take 1 Tablet by mouth 2 times a day., Disp: 60 Tablet, Rfl: 1  Review of Systems:  Review of Systems   Constitutional:  Negative for chills, diaphoresis, fever, malaise/fatigue and weight loss.   HENT:  Negative for tinnitus.    Eyes:  Negative for blurred vision and double vision.   Respiratory:  Negative for cough and shortness of breath.    Cardiovascular:  Negative for chest pain.   Gastrointestinal:  Negative for abdominal pain, blood in stool, constipation, diarrhea, heartburn, melena, nausea and vomiting.   Genitourinary:  Negative for dysuria and hematuria.   Musculoskeletal:  Negative for myalgias.   Skin:  Negative for rash.   Neurological:  Negative for dizziness, tingling, sensory change and headaches.   Endo/Heme/Allergies:  Does not bruise/bleed easily.     Physical Exam:  Vitals:    04/02/25 1107   BP: 117/80   BP Location: Right arm   Patient Position: Sitting   BP Cuff Size: Adult   Pulse: 68   Resp: 16   TempSrc: Temporal   SpO2: 97%   Weight: (!) 138 kg (304 lb)   Height: 1.6 m (5' 2.99\")     Physical Exam  Constitutional:       Appearance: Normal appearance.   Eyes:      Conjunctiva/sclera: Conjunctivae normal.   Cardiovascular:      Rate and Rhythm: Normal rate and regular rhythm.      Heart sounds: Normal heart sounds.   Pulmonary:      Effort: Pulmonary effort is normal.      Breath sounds: Normal breath " sounds.   Abdominal:      General: Abdomen is flat. Bowel sounds are normal.      Palpations: Abdomen is soft.      Tenderness: There is no abdominal tenderness.   Skin:     Coloration: Skin is not pale.      Nails: There is no clubbing.   Neurological:      Mental Status: She is alert and oriented to person, place, and time.   Psychiatric:         Behavior: Behavior normal.     Labs: last labs on 2/25/25 (prior to iron infusion)   Latest Reference Range & Units 04/03/25 06:47   WBC 4.8 - 10.8 K/uL 7.5   RBC 4.20 - 5.40 M/uL 5.26   Hemoglobin 12.0 - 16.0 g/dL 13.5   Hematocrit 37.0 - 47.0 % 44.9   MCV 81.4 - 97.8 fL 85.4   MCH 27.0 - 33.0 pg 25.7 (L)   MCHC 32.2 - 35.5 g/dL 30.1 (L)   RDW 35.9 - 50.0 fL 73.5 (H)   Platelet Count 164 - 446 K/uL 271   MPV 9.0 - 12.9 fL 9.6   Neutrophils-Polys 44.00 - 72.00 % 50.00   Neutrophils (Absolute) 1.82 - 7.42 K/uL 3.75   Lymphocytes 22.00 - 41.00 % 36.80   Lymphs (Absolute) 1.00 - 4.80 K/uL 2.76   Monocytes 0.00 - 13.40 % 7.90   Monos (Absolute) 0.00 - 0.85 K/uL 0.59   Eosinophils 0.00 - 6.90 % 4.40   Eos (Absolute) 0.00 - 0.51 K/uL 0.33   Basophils 0.00 - 1.80 % 0.90   Baso (Absolute) 0.00 - 0.12 K/uL 0.07   Nucleated RBC 0.00 - 0.20 /100 WBC 0.00   NRBC (Absolute) K/uL 0.00   Plt Estimation  Normal   RBC Morphology  Present   Anisocytosis  1+   Microcytosis  1+   Peripheral Smear Review  see below   Manual Diff Status  PERFORMED   Iron 40 - 170 ug/dL 62   Total Iron Binding 250 - 450 ug/dL 352   % Saturation 15 - 55 % 18   Unsat Iron Binding 110 - 370 ug/dL 290   Ferritin 10.0 - 291.0 ng/mL 160.0     Assessment & Plan:  1. Iron deficiency anemia, unspecified iron deficiency anemia type  FERRITIN    IRON/TOTAL IRON BIND    CBC WITH DIFFERENTIAL    ferrous sulfate 325 (65 Fe) MG tablet    CANCELED: CBC WITH DIFFERENTIAL      2. Encounter for hematology follow-up             Iron Deficiency Anemia. Since ~2021, likely due to menstruation  Stability of condition: waxing and  waning    Her cycles have been light over the past year or so where as they were previously heavy every other cycle.  However patient does note that lately her lab work has been completed right after her menstrual cycle    -Continue to trend cbc, iron studies, ferritin q3mo  Her iron stores have improved, currently within normal range.  I would like to start her on Rx oral iron 3 times a week in order to maintain her stores    Return for Follow Up: 3mo with labs, sooner as needed    Any questions and concerns raised by the patient were answered to the best of my ability. Thank you for allowing me to participate in the care for this patient. Please feel free to contact me for any questions or concerns.   Total time spent on chart review, clinic encounter, documentation, coordination of care: 30 minutes.   Please note that this dictation was created using voice recognition software. I have made every reasonable attempt to correct obvious errors, but I expect that there are errors of grammar and possibly content that I did not discover before finalizing the note.

## 2025-04-01 NOTE — TELEPHONE ENCOUNTER
Returned pts call. She needed clarification if she should have labs prior to visit on 4/2. Per aryan she does not need any labs and can be seen. Relayed this msg to pt. Pt understood

## 2025-04-02 ENCOUNTER — HOSPITAL ENCOUNTER (OUTPATIENT)
Dept: HEMATOLOGY ONCOLOGY | Facility: MEDICAL CENTER | Age: 35
End: 2025-04-02
Attending: PHYSICIAN ASSISTANT
Payer: COMMERCIAL

## 2025-04-02 VITALS
SYSTOLIC BLOOD PRESSURE: 117 MMHG | HEART RATE: 68 BPM | WEIGHT: 293 LBS | RESPIRATION RATE: 16 BRPM | DIASTOLIC BLOOD PRESSURE: 80 MMHG | HEIGHT: 63 IN | BODY MASS INDEX: 51.91 KG/M2 | OXYGEN SATURATION: 97 %

## 2025-04-02 DIAGNOSIS — D50.9 IRON DEFICIENCY ANEMIA, UNSPECIFIED IRON DEFICIENCY ANEMIA TYPE: ICD-10-CM

## 2025-04-02 DIAGNOSIS — Z09 ENCOUNTER FOR HEMATOLOGY FOLLOW-UP: ICD-10-CM

## 2025-04-02 PROCEDURE — 99214 OFFICE O/P EST MOD 30 MIN: CPT | Performed by: PHYSICIAN ASSISTANT

## 2025-04-02 PROCEDURE — 99212 OFFICE O/P EST SF 10 MIN: CPT | Performed by: PHYSICIAN ASSISTANT

## 2025-04-02 ASSESSMENT — ENCOUNTER SYMPTOMS
FEVER: 0
VOMITING: 0
DIARRHEA: 0
COUGH: 0
CHILLS: 0
DIZZINESS: 0
BLURRED VISION: 0
HEADACHES: 0
CONSTIPATION: 0
NAUSEA: 0
BLOOD IN STOOL: 0
ABDOMINAL PAIN: 0
SENSORY CHANGE: 0
SHORTNESS OF BREATH: 0
WEIGHT LOSS: 0
DOUBLE VISION: 0
BRUISES/BLEEDS EASILY: 0
MYALGIAS: 0
HEARTBURN: 0
DIAPHORESIS: 0
TINGLING: 0

## 2025-04-02 ASSESSMENT — FIBROSIS 4 INDEX: FIB4 SCORE: 0.59

## 2025-04-03 ENCOUNTER — HOSPITAL ENCOUNTER (OUTPATIENT)
Dept: LAB | Facility: MEDICAL CENTER | Age: 35
End: 2025-04-03
Attending: PHYSICIAN ASSISTANT
Payer: COMMERCIAL

## 2025-04-03 DIAGNOSIS — D50.9 IRON DEFICIENCY ANEMIA, UNSPECIFIED IRON DEFICIENCY ANEMIA TYPE: ICD-10-CM

## 2025-04-03 LAB
ANISOCYTOSIS BLD QL SMEAR: ABNORMAL
BASOPHILS # BLD AUTO: 0.9 % (ref 0–1.8)
BASOPHILS # BLD: 0.07 K/UL (ref 0–0.12)
EOSINOPHIL # BLD AUTO: 0.33 K/UL (ref 0–0.51)
EOSINOPHIL NFR BLD: 4.4 % (ref 0–6.9)
ERYTHROCYTE [DISTWIDTH] IN BLOOD BY AUTOMATED COUNT: 73.5 FL (ref 35.9–50)
FERRITIN SERPL-MCNC: 160 NG/ML (ref 10–291)
HCT VFR BLD AUTO: 44.9 % (ref 37–47)
HGB BLD-MCNC: 13.5 G/DL (ref 12–16)
IRON SATN MFR SERPL: 18 % (ref 15–55)
IRON SERPL-MCNC: 62 UG/DL (ref 40–170)
LYMPHOCYTES # BLD AUTO: 2.76 K/UL (ref 1–4.8)
LYMPHOCYTES NFR BLD: 36.8 % (ref 22–41)
MANUAL DIFF BLD: NORMAL
MCH RBC QN AUTO: 25.7 PG (ref 27–33)
MCHC RBC AUTO-ENTMCNC: 30.1 G/DL (ref 32.2–35.5)
MCV RBC AUTO: 85.4 FL (ref 81.4–97.8)
MICROCYTES BLD QL SMEAR: ABNORMAL
MONOCYTES # BLD AUTO: 0.59 K/UL (ref 0–0.85)
MONOCYTES NFR BLD AUTO: 7.9 % (ref 0–13.4)
MORPHOLOGY BLD-IMP: NORMAL
NEUTROPHILS # BLD AUTO: 3.75 K/UL (ref 1.82–7.42)
NEUTROPHILS NFR BLD: 50 % (ref 44–72)
NRBC # BLD AUTO: 0 K/UL
NRBC BLD-RTO: 0 /100 WBC (ref 0–0.2)
PLATELET # BLD AUTO: 271 K/UL (ref 164–446)
PLATELET BLD QL SMEAR: NORMAL
PMV BLD AUTO: 9.6 FL (ref 9–12.9)
RBC # BLD AUTO: 5.26 M/UL (ref 4.2–5.4)
RBC BLD AUTO: PRESENT
TIBC SERPL-MCNC: 352 UG/DL (ref 250–450)
UIBC SERPL-MCNC: 290 UG/DL (ref 110–370)
WBC # BLD AUTO: 7.5 K/UL (ref 4.8–10.8)

## 2025-04-03 PROCEDURE — 85027 COMPLETE CBC AUTOMATED: CPT

## 2025-04-03 PROCEDURE — 36415 COLL VENOUS BLD VENIPUNCTURE: CPT

## 2025-04-03 PROCEDURE — 82728 ASSAY OF FERRITIN: CPT

## 2025-04-03 PROCEDURE — 83550 IRON BINDING TEST: CPT

## 2025-04-03 PROCEDURE — 85007 BL SMEAR W/DIFF WBC COUNT: CPT

## 2025-04-03 PROCEDURE — 83540 ASSAY OF IRON: CPT

## 2025-04-03 RX ORDER — FERROUS SULFATE 325(65) MG
TABLET ORAL
Qty: 24 TABLET | Refills: 1 | Status: SHIPPED | OUTPATIENT
Start: 2025-04-03

## 2025-06-03 ENCOUNTER — APPOINTMENT (OUTPATIENT)
Dept: OBGYN | Facility: CLINIC | Age: 35
End: 2025-06-03
Attending: FAMILY MEDICINE
Payer: COMMERCIAL